# Patient Record
Sex: FEMALE | Race: BLACK OR AFRICAN AMERICAN | URBAN - METROPOLITAN AREA
[De-identification: names, ages, dates, MRNs, and addresses within clinical notes are randomized per-mention and may not be internally consistent; named-entity substitution may affect disease eponyms.]

---

## 2023-02-20 ENCOUNTER — OFFICE VISIT (OUTPATIENT)
Dept: URBAN - METROPOLITAN AREA CLINIC 17 | Facility: CLINIC | Age: 28
End: 2023-02-20
Payer: COMMERCIAL

## 2023-02-20 ENCOUNTER — DASHBOARD ENCOUNTERS (OUTPATIENT)
Age: 28
End: 2023-02-20

## 2023-02-20 ENCOUNTER — WEB ENCOUNTER (OUTPATIENT)
Dept: URBAN - METROPOLITAN AREA CLINIC 17 | Facility: CLINIC | Age: 28
End: 2023-02-20

## 2023-02-20 VITALS
HEART RATE: 80 BPM | SYSTOLIC BLOOD PRESSURE: 104 MMHG | WEIGHT: 137 LBS | HEIGHT: 66 IN | DIASTOLIC BLOOD PRESSURE: 68 MMHG | BODY MASS INDEX: 22.02 KG/M2 | TEMPERATURE: 98.2 F

## 2023-02-20 DIAGNOSIS — K21.00 GASTROESOPHAGEAL REFLUX DISEASE WITH ESOPHAGITIS WITHOUT HEMORRHAGE: ICD-10-CM

## 2023-02-20 DIAGNOSIS — K92.89 GAS BLOAT SYNDROME: ICD-10-CM

## 2023-02-20 DIAGNOSIS — K59.09 CHANGE IN BOWEL MOVEMENTS INTERMITTENT CONSTIPATION. URGENCY IN THE MORNING.: ICD-10-CM

## 2023-02-20 DIAGNOSIS — R11.0 CHRONIC NAUSEA: ICD-10-CM

## 2023-02-20 PROBLEM — 37344009: Status: ACTIVE | Noted: 2023-02-20

## 2023-02-20 PROBLEM — 34713006: Status: ACTIVE | Noted: 2023-02-20

## 2023-02-20 PROBLEM — 35298007: Status: ACTIVE | Noted: 2023-02-20

## 2023-02-20 PROBLEM — 266433003: Status: ACTIVE | Noted: 2023-02-20

## 2023-02-20 PROCEDURE — 99204 OFFICE O/P NEW MOD 45 MIN: CPT | Performed by: INTERNAL MEDICINE

## 2023-02-20 PROCEDURE — 99245 OFF/OP CONSLTJ NEW/EST HI 55: CPT | Performed by: INTERNAL MEDICINE

## 2023-02-20 RX ORDER — ONDANSETRON HYDROCHLORIDE 8 MG/1
1 CAPSULE TABLET, FILM COATED ORAL
Qty: 90 | Refills: 1 | OUTPATIENT
Start: 2023-02-20 | End: 2023-04-21

## 2023-02-20 RX ORDER — LUBIPROSTONE 8 UG/1
1 CAPSULE WITH FOOD AND WATER CAPSULE, GELATIN COATED ORAL TWICE A DAY
Qty: 180 CAPSULE | Refills: 3 | OUTPATIENT
Start: 2023-02-20 | End: 2024-02-15

## 2023-02-20 RX ORDER — OMEPRAZOLE 40 MG/1
TAKE ONE CAPSULE BY MOUTH EVERY MORNING CAPSULE, DELAYED RELEASE ORAL
Qty: 90 UNSPECIFIED | Refills: 0 | Status: ACTIVE | COMMUNITY

## 2023-02-20 RX ORDER — FLUCONAZOLE 150 MG/1
TAKE 1 TABLET BY MOUTH AS A SINGLE DOSE. MAY REPEAT IN 1 WEEK IF STILL SYMPTOMATIC TABLET ORAL
Qty: 2 UNSPECIFIED | Refills: 0 | Status: ACTIVE | COMMUNITY

## 2023-02-20 NOTE — PHYSICAL EXAM HENT:
Head, normocephalic, atraumatic, Face, Face within normal limits, Ears, External ears within normal limits, Nose/Nasopharynx, External nose normal appearance, nares patent, no nasal discharge, Mouth and Throat, Oral cavity appearance normal, Lips, Appearance normal
COUGH

## 2023-02-20 NOTE — HPI-TODAY'S VISIT:
The patient has a history of Midepigastric pain and gas with  bloating and anorexia who presents for a GI evaluation. The pt  notes that she has noted incrreased gas with bloating and regular BM's. The pt notes that her anxiety levels are high and she notes that she is not eating on regular basis. She states that she has constant nausea. The pt went to an urgent care for nausea.

## 2023-02-21 ENCOUNTER — OFFICE VISIT (OUTPATIENT)
Dept: URBAN - METROPOLITAN AREA SURGERY CENTER 16 | Facility: SURGERY CENTER | Age: 28
End: 2023-02-21
Payer: COMMERCIAL

## 2023-02-21 ENCOUNTER — CLAIMS CREATED FROM THE CLAIM WINDOW (OUTPATIENT)
Dept: URBAN - METROPOLITAN AREA CLINIC 4 | Facility: CLINIC | Age: 28
End: 2023-02-21
Payer: COMMERCIAL

## 2023-02-21 DIAGNOSIS — K31.89 OTHER DISEASES OF STOMACH AND DUODENUM: ICD-10-CM

## 2023-02-21 DIAGNOSIS — R10.13 ABDOMINAL DISCOMFORT, EPIGASTRIC: ICD-10-CM

## 2023-02-21 PROCEDURE — G8907 PT DOC NO EVENTS ON DISCHARG: HCPCS | Performed by: INTERNAL MEDICINE

## 2023-02-21 PROCEDURE — 88305 TISSUE EXAM BY PATHOLOGIST: CPT | Performed by: PATHOLOGY

## 2023-02-21 PROCEDURE — 43239 EGD BIOPSY SINGLE/MULTIPLE: CPT | Performed by: INTERNAL MEDICINE

## 2023-02-21 RX ORDER — FLUCONAZOLE 150 MG/1
TAKE 1 TABLET BY MOUTH AS A SINGLE DOSE. MAY REPEAT IN 1 WEEK IF STILL SYMPTOMATIC TABLET ORAL
Qty: 2 UNSPECIFIED | Refills: 0 | Status: ACTIVE | COMMUNITY

## 2023-02-21 RX ORDER — LUBIPROSTONE 8 UG/1
1 CAPSULE WITH FOOD AND WATER CAPSULE, GELATIN COATED ORAL TWICE A DAY
Qty: 180 CAPSULE | Refills: 3 | Status: ACTIVE | COMMUNITY
Start: 2023-02-20 | End: 2024-02-15

## 2023-02-21 RX ORDER — OMEPRAZOLE 40 MG/1
TAKE ONE CAPSULE BY MOUTH EVERY MORNING CAPSULE, DELAYED RELEASE ORAL
Qty: 90 UNSPECIFIED | Refills: 0 | Status: ACTIVE | COMMUNITY

## 2023-02-21 RX ORDER — ONDANSETRON HYDROCHLORIDE 8 MG/1
1 CAPSULE TABLET, FILM COATED ORAL
Qty: 90 | Refills: 1 | Status: ACTIVE | COMMUNITY
Start: 2023-02-20 | End: 2023-04-21

## 2023-02-22 LAB
A/G RATIO: 1.5
ABSOLUTE BASOPHILS: 31
ABSOLUTE EOSINOPHILS: 150
ABSOLUTE LYMPHOCYTES: 1382
ABSOLUTE MONOCYTES: 488
ABSOLUTE NEUTROPHILS: 2350
ALBUMIN: 4.1
ALKALINE PHOSPHATASE: 63
ALT (SGPT): 12
AST (SGOT): 22
BASOPHILS: 0.7
BILIRUBIN, TOTAL: 1
BUN/CREATININE RATIO: (no result)
BUN: 9
CALCIUM: 9.3
CARBON DIOXIDE, TOTAL: 25
CHLORIDE: 106
CREATININE: 0.73
EGFR: 117
EOSINOPHILS: 3.4
GLOBULIN, TOTAL: 2.8
GLUCOSE: 91
HEMATOCRIT: 33.3
HEMOGLOBIN: 10.1
LIPASE: 17
LYMPHOCYTES: 31.4
MCH: 22.7
MCHC: 30.3
MCV: 75
MONOCYTES: 11.1
MPV: 9.9
NEUTROPHILS: 53.4
PLATELET COUNT: 343
POTASSIUM: 4.2
PROTEIN, TOTAL: 6.9
RDW: 15.9
RED BLOOD CELL COUNT: 4.44
SODIUM: 138
WHITE BLOOD CELL COUNT: 4.4

## 2023-02-27 ENCOUNTER — TELEPHONE ENCOUNTER (OUTPATIENT)
Dept: URBAN - METROPOLITAN AREA CLINIC 23 | Facility: CLINIC | Age: 28
End: 2023-02-27

## 2023-02-27 RX ORDER — ONDANSETRON HYDROCHLORIDE 8 MG/1
1 CAPSULE TABLET, FILM COATED ORAL
Qty: 90 | Refills: 1
Start: 2023-02-20 | End: 2023-04-28